# Patient Record
Sex: MALE | Race: WHITE | NOT HISPANIC OR LATINO | Employment: OTHER | URBAN - NONMETROPOLITAN AREA
[De-identification: names, ages, dates, MRNs, and addresses within clinical notes are randomized per-mention and may not be internally consistent; named-entity substitution may affect disease eponyms.]

---

## 2017-10-17 ENCOUNTER — OFFICE VISIT (OUTPATIENT)
Dept: RETAIL CLINIC | Facility: CLINIC | Age: 72
End: 2017-10-17

## 2017-10-17 DIAGNOSIS — Z23 INFLUENZA VACCINE ADMINISTERED: Primary | ICD-10-CM

## 2017-10-17 NOTE — PROGRESS NOTES
Patient presents to clinic for seasonal influenza vaccination.  Denies allergies to eggs, latex, mercury or other flu vaccine components.  Denies previous vaccine reaction, current illness or fever.      Consent discussed and signed. VIS given. Vaccination administered. Patient tolerated well. See scanned documents.       Wallace was seen today for flu vaccine.    Diagnoses and all orders for this visit:    Influenza vaccine administered  -     Flu Vaccine High Dose PF 65YR+

## 2018-06-15 ENCOUNTER — HOSPITAL ENCOUNTER (EMERGENCY)
Facility: HOSPITAL | Age: 73
Discharge: HOME | End: 2018-06-15
Admitting: EMERGENCY MEDICINE

## 2018-06-15 VITALS
HEIGHT: 72 IN | OXYGEN SATURATION: 99 % | SYSTOLIC BLOOD PRESSURE: 161 MMHG | RESPIRATION RATE: 17 BRPM | DIASTOLIC BLOOD PRESSURE: 73 MMHG | HEART RATE: 56 BPM | BODY MASS INDEX: 28.44 KG/M2 | TEMPERATURE: 97.2 F | WEIGHT: 210 LBS

## 2018-06-15 DIAGNOSIS — S61.411A LACERATION OF RIGHT HAND WITHOUT FOREIGN BODY, INITIAL ENCOUNTER: Primary | ICD-10-CM

## 2018-06-15 PROCEDURE — 96372 THER/PROPH/DIAG INJ SC/IM: CPT

## 2018-06-15 PROCEDURE — 25010000003 CEFAZOLIN PER 500 MG: Performed by: NURSE PRACTITIONER

## 2018-06-15 PROCEDURE — 90715 TDAP VACCINE 7 YRS/> IM: CPT | Performed by: NURSE PRACTITIONER

## 2018-06-15 PROCEDURE — 99283 EMERGENCY DEPT VISIT LOW MDM: CPT

## 2018-06-15 PROCEDURE — 90471 IMMUNIZATION ADMIN: CPT | Performed by: NURSE PRACTITIONER

## 2018-06-15 PROCEDURE — 25010000002 TDAP 5-2.5-18.5 LF-MCG/0.5 SUSPENSION: Performed by: NURSE PRACTITIONER

## 2018-06-15 RX ORDER — LISINOPRIL 20 MG/1
20 TABLET ORAL DAILY
COMMUNITY

## 2018-06-15 RX ORDER — CEFAZOLIN SODIUM 1 G/3ML
1 INJECTION, POWDER, FOR SOLUTION INTRAMUSCULAR; INTRAVENOUS ONCE
Status: COMPLETED | OUTPATIENT
Start: 2018-06-15 | End: 2018-06-15

## 2018-06-15 RX ORDER — ATENOLOL 25 MG/1
25 TABLET ORAL DAILY
COMMUNITY

## 2018-06-15 RX ORDER — CEPHALEXIN 500 MG/1
500 CAPSULE ORAL 3 TIMES DAILY
Qty: 30 CAPSULE | Refills: 0 | Status: SHIPPED | OUTPATIENT
Start: 2018-06-15 | End: 2018-06-25

## 2018-06-15 RX ADMIN — TETANUS TOXOID, REDUCED DIPHTHERIA TOXOID AND ACELLULAR PERTUSSIS VACCINE, ADSORBED 0.5 ML: 5; 2.5; 8; 8; 2.5 SUSPENSION INTRAMUSCULAR at 12:03

## 2018-06-15 RX ADMIN — CEFAZOLIN 1 G: 1 INJECTION, POWDER, FOR SOLUTION INTRAMUSCULAR; INTRAVENOUS; PARENTERAL at 12:47

## 2018-06-15 NOTE — ED PROVIDER NOTES
Subjective     History provided by:  Patient   used: No    Hand Injury   Location:  Hand  Hand location:  R hand  Injury: no    Pain details:     Quality:  Shooting    Radiates to:  R wrist    Onset quality:  Sudden    Duration:  1 day    Timing:  Constant    Progression:  Unchanged  Handedness:  Right-handed  Dislocation: no    Foreign body present:  No foreign bodies  Tetanus status:  Unknown  Prior injury to area:  No  Relieved by:  Nothing  Worsened by:  Movement  Ineffective treatments:  Elevation  Associated symptoms: no back pain, no muscle weakness, no numbness, no swelling and no tingling    Risk factors: no concern for non-accidental trauma and no recent illness        Review of Systems   Constitutional: Negative.    HENT: Negative.    Eyes: Negative.    Respiratory: Negative.    Cardiovascular: Negative.    Gastrointestinal: Negative.    Endocrine: Negative.    Genitourinary: Negative.    Musculoskeletal: Negative.  Negative for back pain.   Skin: Negative.    Allergic/Immunologic: Negative.    Neurological: Negative.    Hematological: Negative.    Psychiatric/Behavioral: Negative.        Past Medical History:   Diagnosis Date   • Hypertension        No Known Allergies    History reviewed. No pertinent surgical history.    History reviewed. No pertinent family history.    Social History     Social History   • Marital status:      Social History Main Topics   • Smoking status: Never Smoker   • Drug use: No   • Sexual activity: Defer     Other Topics Concern   • Not on file           Objective   Physical Exam   Constitutional: He is oriented to person, place, and time. He appears well-developed and well-nourished.   HENT:   Head: Normocephalic.   Right Ear: External ear normal.   Left Ear: External ear normal.   Mouth/Throat: Oropharynx is clear and moist.   Eyes: EOM are normal. Pupils are equal, round, and reactive to light.   Neck: Normal range of motion. Neck supple.    Cardiovascular: Normal rate and regular rhythm.    Pulmonary/Chest: Effort normal and breath sounds normal.   Abdominal: Bowel sounds are normal.   Musculoskeletal: Normal range of motion.   Patient unable to extend right middle finger against pressure.  Finger wants to go to more flexed position when released. Patient can flex without problem and ROM with extension is limited   Neurological: He is alert and oriented to person, place, and time.   Skin: Skin is warm and dry. Capillary refill takes less than 2 seconds.   Curved laceration on right hand at MCP joint. Wound well approximated. Upon exploring wound unable to visualize tendon well, so no tendon damage is directly seen   Psychiatric: He has a normal mood and affect. His behavior is normal.   Nursing note and vitals reviewed.      Procedures           ED Course  ED Course as of Joaquin 17 0558   Fri Joaquin 15, 2018   1220 Spoke with Dr. Simmons.  Advised that wound was approximated buit was deep and patient's finger wants to stay in flexed position much more than other fingers and patient is unable to extend against pressure more than a very small distance. Explained that due to laceration being from yesterday, and the fact that wound will have to be opened by ortho to evaluate for any tendon damage and repair, that we will place patient on antibiotics and apply dressing and Dr. Simmons advises to send patient to office next Tuesday 6/19..   [SIA]      ED Course User Index  [SIA] JALYN Pringle                  [facility]      Final diagnoses:   Laceration of right hand without foreign body, initial encounter            JALYN Pringle  06/17/18 [id number]

## 2018-06-15 NOTE — ED NOTES
Cleaned hand with surgical scrub, normal saline and 4X4. Nonstick and wrapped     May Willingham  06/15/18 2155

## 2018-06-15 NOTE — DISCHARGE INSTRUCTIONS
Follow up with Dr. Simmons on Tuesday. Call office today and let them know you were seen in ER and instructed to follow up on Tuesday, 6/19/2018.    Return to the emergency room for worsening symptoms.

## 2018-06-18 ENCOUNTER — EPISODE CHANGES (OUTPATIENT)
Dept: CASE MANAGEMENT | Facility: OTHER | Age: 73
End: 2018-06-18

## 2019-07-25 RX ORDER — VALACYCLOVIR HYDROCHLORIDE 1 G/1
TABLET, FILM COATED ORAL
Qty: 12 TABLET | Refills: 12 | OUTPATIENT
Start: 2019-07-25

## 2019-07-30 RX ORDER — VALACYCLOVIR HYDROCHLORIDE 1 G/1
TABLET, FILM COATED ORAL
Qty: 12 TABLET | Refills: 12 | OUTPATIENT
Start: 2019-07-30